# Patient Record
(demographics unavailable — no encounter records)

---

## 2025-07-25 NOTE — HISTORY OF PRESENT ILLNESS
[N] : Patient denies prior pregnancies [TextBox_4] : 54yo  LMP 7/7/25 s/p Mirena IUD replacement on 4/23/2020.  She used to have regular by light.  Monthly before, her menses are becoming somewhat irregular now and she skipped it 1 month this year.  She is also experiencing occasional hot flashes which are waking her up at night rendering her tired the next day with difficulty focusing.  Hx of colon resection on 10/2019 at [facility] for colon cancer. 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  She is also s/p laparoscopic right oophorectomy in 2009 for an endometrioma.   [Mammogramdate] : 10/18/24 [PapSmeardate] : 7/25/25 [ColonoscopyDate] : 12/2023 [PGHxTotal] : 0

## 2025-07-25 NOTE — PHYSICAL EXAM
[Chaperoned Physical Exam] : A chaperone was present in the examining room during all aspects of the physical examination. [MA] : MA [Examination Of The Breasts] : a normal appearance [No Masses] : no breast masses were palpable [Labia Majora] : normal [Labia Minora] : normal [Normal] : normal [Uterine Adnexae] : non-palpable [FreeTextEntry2] : Lola [FreeTextEntry5] : no visible strings

## 2025-07-25 NOTE — PLAN
[FreeTextEntry1] : Perimenopause and contraception were both discussed today in great detail.  As far as contraception go she would like her IUD to be replaced as her.  Have become slightly heavier.  She is interested in hormone replacement therapy, particularly estrogen and is aware of that we would require a progesterone as well as she has an intact uterus.  Hence she would prefer to use an IUD rather than taking an oral pill she says.  We did review risks and benefits of estrogen therapy today though we decided that we would first replace the IUD and see how she feels and go from there.  Those risks discussed were including, but not limited to, DVT, blood clots, stroke, MI, breast cancer and gallbladder disease.